# Patient Record
Sex: MALE | Employment: FULL TIME | URBAN - METROPOLITAN AREA
[De-identification: names, ages, dates, MRNs, and addresses within clinical notes are randomized per-mention and may not be internally consistent; named-entity substitution may affect disease eponyms.]

---

## 2018-09-20 ENCOUNTER — TRANSFERRED RECORDS (OUTPATIENT)
Dept: HEALTH INFORMATION MANAGEMENT | Facility: CLINIC | Age: 53
End: 2018-09-20

## 2019-05-22 ENCOUNTER — TRANSFERRED RECORDS (OUTPATIENT)
Dept: HEALTH INFORMATION MANAGEMENT | Facility: CLINIC | Age: 54
End: 2019-05-22

## 2020-08-26 ENCOUNTER — TRANSCRIBE ORDERS (OUTPATIENT)
Dept: OTHER | Age: 55
End: 2020-08-26

## 2020-08-26 DIAGNOSIS — Z86.018 HX OF LIPOMA: Primary | ICD-10-CM

## 2021-01-27 NOTE — TELEPHONE ENCOUNTER
FUTURE VISIT INFORMATION      FUTURE VISIT INFORMATION:    Date: 2.23.21    Time: 8:30 AM    Location: [facility]  REFERRAL INFORMATION:    Referring provider:  Ty Marquez    Referring providers clinic:  [place]    Reason for visit/diagnosis  Lipoma    RECORDS REQUESTED FROM:       Clinic name Comments Records Status Imaging Status   [facility] 3.13.20 Dr. Chery  3.20.18 Dr. Chery CE    [facility]  In process                              Action 1.27.21 MJ   Action Taken Per appt notes, pt seen at [facility]. Googled and nothing appeared with that name.   Referral from [facility] in [place]  Med recs request to fax number [phone]     Action 2.22.21 MJ   Action Taken Sent urgent request.      Action 2.23.21 MJ    Action Taken Sent urgent request.

## 2021-02-22 NOTE — PROGRESS NOTES
"PLASTIC SURGERY HISTORY AND PHYSICAL    Chief Complaint: L groin swelling  Referring Provider: Ty Marquez      HPI:Darrel is a 55 year old male who presents with L groin swelling. Had it excised in Sept 2018 but it has come back larger. After surgery area was never completely flat but it is now larger. Was told in the past that it was a lipoma. Complains that is bothersome and makes daily activities difficult. Also feels that it affects social aspects of his life. Also complains of lymphedema to L left. Has been told to elevate it, reduce salt intake, etc but has not been strictly adherent to this.      PMH:   Past Medical History:   Diagnosis Date     Obesity      VSD (ventricular septal defect)      PSH:   Past Surgical History:   Procedure Laterality Date     ------------OTHER-------------  2018     HERNIA REPAIR, UMBILICAL  2015      FH:   No family history on file.     SH:   Social History     Tobacco Use     Smoking status: Never Smoker     Smokeless tobacco: Never Used   Substance Use Topics     Alcohol use: None     Drug use: None      Works at factory.    MEDS:     Current Outpatient Medications:      furosemide (LASIX) 20 MG tablet, TAKE 1 TO 2 TABLETS BY MOUTH DAILY AS NEEDED FOR LEG SWELLING, Disp: , Rfl:        ALLERGIES:   No Known Allergies     ROS: Denies chest pain, shortness of breath, MI, CVA, DVT, PE, and bleeding disorders.     PHYSICAL EXAMINATION:   /87 (BP Location: Left arm, Patient Position: Chair, Cuff Size: Adult Large)   Pulse 73   Ht 1.854 m (6' 1\")   Wt (!) 176.4 kg (389 lb)   SpO2 96%   BMI 51.32 kg/m     BMI: Body mass index is 51.32 kg/m .  General: NAD  Groin: L groin with large area of excess skin that is edematous, firm. Prior scar from surgery. approx 16cm x30cm.   LE: LLE more edematous than RLE, both with dark discoloration to lower legs.      ASSESSMENT: Darrel is a 55 year old male who presents with PMH VSD, obesity and recurrent L groin edematous " tissue.      PLAN:     -I spoke with Dr. Reyes and there are multiple concerns. Mass would be difficult to close due to tight skin and have 100% chance of wound healing issues. Also, since he has had it removed in the past and it came back it is likely to come back again unless underlying problems are treated. Recommend lymphedema therapy and following through with weight loss, was previously referred to bariatric surgery. This may help loosen the skin. Lastly, this may issues with insurance coverage since it was already removed once in the past.    Lavern Garcia PA-C  Plastic and Reconstructive Surgery     45 minutes spent on the date of the encounter doing chart review, history and physical, dressing changes, documentation and further activity as noted above.

## 2021-02-23 ENCOUNTER — PRE VISIT (OUTPATIENT)
Dept: PLASTIC SURGERY | Facility: CLINIC | Age: 56
End: 2021-02-23

## 2021-02-23 ENCOUNTER — OFFICE VISIT (OUTPATIENT)
Dept: PLASTIC SURGERY | Facility: CLINIC | Age: 56
End: 2021-02-23
Attending: SURGERY

## 2021-02-23 VITALS
WEIGHT: 315 LBS | BODY MASS INDEX: 41.75 KG/M2 | OXYGEN SATURATION: 96 % | DIASTOLIC BLOOD PRESSURE: 87 MMHG | HEIGHT: 73 IN | SYSTOLIC BLOOD PRESSURE: 138 MMHG | HEART RATE: 73 BPM

## 2021-02-23 DIAGNOSIS — I89.0 LYMPHEDEMA OF LEFT LEG: Primary | ICD-10-CM

## 2021-02-23 DIAGNOSIS — E66.01 MORBID OBESITY (H): ICD-10-CM

## 2021-02-23 PROCEDURE — 99204 OFFICE O/P NEW MOD 45 MIN: CPT | Performed by: PHYSICIAN ASSISTANT

## 2021-02-23 RX ORDER — FUROSEMIDE 20 MG
TABLET ORAL
COMMUNITY
Start: 2021-01-13

## 2021-02-23 ASSESSMENT — MIFFLIN-ST. JEOR: SCORE: 2653.37

## 2021-02-23 ASSESSMENT — PAIN SCALES - GENERAL: PAINLEVEL: NO PAIN (0)

## 2021-02-23 NOTE — TELEPHONE ENCOUNTER
Records received 02/23/21    Facility  [facility]   Outcome Received report for 5/22/2019 CT Abd/Pelv, sent to urgent scanning - [facility]

## 2021-02-24 ENCOUNTER — PATIENT OUTREACH (OUTPATIENT)
Dept: PLASTIC SURGERY | Facility: CLINIC | Age: 56
End: 2021-02-24

## 2021-02-24 NOTE — PATIENT INSTRUCTIONS
Left message for pt regarding care plan. Provided direct contact information and requested call back to discuss.Alanna WILEY RN, BSN  Spoke with pt and relayed per Dr. Reyes mass would be difficult to close due to tight skin and there would be a 100% chance of wound healing issues. Also, since he has had it removed in the past and it came back it is likely to come back again unless underlying problems are treated. Recommend lymphedema therapy and following through with weight loss, was previously referred to bariatric surgery. This may help loosen the skin. Lastly, this may issues with insurance coverage since it was already removed once in the past. Pt states understanding. Declines referrals at this time. States he will reach out if he wishes to pursue this in the future. Alanna WILEY RN, BSN